# Patient Record
Sex: FEMALE | Race: AMERICAN INDIAN OR ALASKA NATIVE
[De-identification: names, ages, dates, MRNs, and addresses within clinical notes are randomized per-mention and may not be internally consistent; named-entity substitution may affect disease eponyms.]

---

## 2018-08-02 ENCOUNTER — HOSPITAL ENCOUNTER (OUTPATIENT)
Dept: HOSPITAL 31 - C.SDS | Age: 28
Discharge: HOME | End: 2018-08-02
Attending: PODIATRIST
Payer: MEDICAID

## 2018-08-02 VITALS — TEMPERATURE: 97.6 F

## 2018-08-02 VITALS — BODY MASS INDEX: 45.5 KG/M2

## 2018-08-02 VITALS
HEART RATE: 72 BPM | OXYGEN SATURATION: 99 % | RESPIRATION RATE: 18 BRPM | DIASTOLIC BLOOD PRESSURE: 73 MMHG | SYSTOLIC BLOOD PRESSURE: 135 MMHG

## 2018-08-02 DIAGNOSIS — M21.611: Primary | ICD-10-CM

## 2018-08-02 DIAGNOSIS — M20.11: ICD-10-CM

## 2018-08-02 PROCEDURE — 28296 COR HLX VLGS DSTL MTAR OSTEO: CPT

## 2018-08-02 PROCEDURE — 73620 X-RAY EXAM OF FOOT: CPT

## 2018-08-02 PROCEDURE — 88304 TISSUE EXAM BY PATHOLOGIST: CPT

## 2018-08-02 NOTE — RAD
Date of service: 



08/02/2018



PROCEDURE:  Right Foot Radiographs.



HISTORY:

s/p right foot surgery  



COMPARISON:

None.



FINDINGS:



BONES:

Normal. No fracture. 



JOINTS:

Normal. 



SOFT TISSUES:

Subcutaneous air identified at the level of the digits.  Adjacent 

soft tissue swelling noted. 



OTHER FINDINGS:

None.



IMPRESSION:

Postoperative findings identified.

## 2018-08-02 NOTE — PCM.SURG1
Surgeon's Initial Post Op Note





- Surgeon's Notes


Surgeon: Dr. Jayme Aguero


Assistant: Dr. Carranza PGY-3, Dr. Osman PGY-1 


Type of Anesthesia: IV Sedation, Local


Anesthesia Administered By: Dima Hackett


Pre-Operative Diagnosis: right foot hallux valgus deformity


Operative Findings: see dictation.  3-0, 4-0 vicryl, 4-0 nylon.  16CC 2% 

lidocaine, 8 CC 0.5%marcaine plain, 1CC dex 4mg/kg


Post-Operative Diagnosis: same


Operation Performed: right foot bunionectomy


Specimen/Specimens Removed: bone


Estimated Blood Loss: EBL {In ML}: 2


Blood Products Given: N/A


Drains Used: No Drains


Post-Op Condition: Good


Date of Surgery/Procedure: 08/02/18


Time of Surgery/Procedure: 09:06

## 2018-08-03 NOTE — OP
Copied To:  Catarina Carranza DPM

Attending MD:  Jayme Aguero DPM



PROCEDURE DATE:  08/02/2018



SURGEON:  Jayme Aguero DPM.



ASSISTANT:  Catarina Carranza, PGY-3, Pedro Sandoval MD, PGY-1.



ANESTHETIST:  Tony Mitchell MD _____.



ANESTHESIA:  IV sedation with local.



PREOPERATIVE DIAGNOSIS:  Right foot painful hallux valgus deformity.



POSTOPERATIVE DIAGNOSIS:  Right foot painful hallux valgus deformity.



NAME OF PROCEDURE:  Right foot first metatarsal osteotomy with cutting of

soft tissue and bone.



INDICATIONS:  The patient is a 27-year-old female with the above-mentioned

diagnosis.  The patient has exhausted multiple forms of conservative

treatment at this time and now requires surgical intervention.  The patient

signed a consent after careful explanation of risks, benefits,

complications, and alternatives for surgical procedure.  No guarantees were

given nor implied.  The patient was brought into the operating room and

placed on the operating room table in a supine position.  A time-out was

performed for identification of the correct patient and procedure.  The

patient received a total of 16 mL of 2% lidocaine plain in a local

block-type fashion to the right foot.  Once local anesthesia was achieved,

the right foot was then prepped and draped in normal sterile manner and the

procedure began.



DESCRIPTION OF PROCEDURE:  Attention was directed to the dorsal aspect of

the first metatarsal head of the right foot, where an approximately 4 cm

linear longitudinal incision was made medial and parallel to the tendon of

the extensor hallucis longus involving the contour of the deformity.  The

incision was deepened through subcutaneous tissue with care being taken to

identify and retract all vital neurovascular structures.  All bleeders were

cauterized and ligated as necessary.  At this time, an inverted L-type

capsulotomy was performed over the dorsal aspect of the first

metatarsophalangeal joint.  The periosteum and capsular structures were

then carefully dissected free of other osseous attachments and resected

medially and laterally thus exposing the head of the first metatarsal into

the operative field.  Next utilizing a sagittal bone saw, the hypertrophied

medial eminence was resected and passed from the operative field.  All

rough edges were smoothed down as necessary.  Correction of the deformity

was assessed at this time and noted to be excellent.  The incision site was

then copiously irrigated with sterile normal saline.  The periosteum and

capsular structures were then reapproximated with 3-0 Vicryl.  The

subcutaneous tissue was reapproximated with 4-0 Vicryl and the skin was

reapproximated with 4-0 nylon.  Postoperative injection of 8 mL of 0.5%

Marcaine plain and 1 mL of dexamethasone 4 mg/kg was given in the local

block-type fashion to the right foot.  The postoperative dressing including

Xeroform, 4 x 4 gauze, Lilly, Coban was applied to the right foot.



POSTOPERATIVE CONDITION:  The patient tolerated the anesthesia and the

procedure well and was escorted to the recovery room with vital signs

stable and neurovascular status intact to the right foot.  The patient will

follow up with Dr. Aguero as an outpatient.





__________________________________________

Catarina Carranza DPM





__________________________________________

<Jayme Aguero DPM>



DD:  08/03/2018 7:57:08

DT:  08/03/2018 9:12:37

Job # [id number]

## 2018-10-26 ENCOUNTER — HOSPITAL ENCOUNTER (INPATIENT)
Dept: HOSPITAL 42 - ED | Age: 28
LOS: 2 days | Discharge: LEFT BEFORE DISCHARGE | DRG: 430 | End: 2018-10-28
Attending: PSYCHIATRY & NEUROLOGY | Admitting: PSYCHIATRY & NEUROLOGY
Payer: MEDICAID

## 2018-10-26 VITALS — OXYGEN SATURATION: 100 %

## 2018-10-26 VITALS — BODY MASS INDEX: 43.5 KG/M2

## 2018-10-26 DIAGNOSIS — F41.9: ICD-10-CM

## 2018-10-26 DIAGNOSIS — F32.2: Primary | ICD-10-CM

## 2018-10-26 LAB
ALBUMIN SERPL-MCNC: 4.4 G/DL (ref 3–4.8)
ALBUMIN/GLOB SERPL: 1.2 {RATIO} (ref 1.1–1.8)
ALT SERPL-CCNC: 15 U/L (ref 7–56)
AMORPH SED URNS QL MICRO: (no result)
APAP SERPL-MCNC: < 10 UG/ML (ref 10–20)
APPEARANCE UR: CLEAR
AST SERPL-CCNC: 17 U/L (ref 14–36)
BACTERIA #/AREA URNS HPF: (no result) /[HPF]
BASOPHILS # BLD AUTO: 0.03 K/MM3 (ref 0–2)
BASOPHILS NFR BLD: 0.3 % (ref 0–3)
BILIRUB UR-MCNC: NEGATIVE MG/DL
BUN SERPL-MCNC: 15 MG/DL (ref 7–21)
CALCIUM SERPL-MCNC: 9.6 MG/DL (ref 8.4–10.5)
COLOR UR: YELLOW
EOSINOPHIL # BLD: 0.1 10*3/UL (ref 0–0.7)
EOSINOPHIL NFR BLD: 0.6 % (ref 1.5–5)
ERYTHROCYTE [DISTWIDTH] IN BLOOD BY AUTOMATED COUNT: 14.5 % (ref 11.5–14.5)
GFR NON-AFRICAN AMERICAN: > 60
GLUCOSE UR STRIP-MCNC: NEGATIVE MG/DL
GRANULOCYTES # BLD: 4.39 10*3/UL (ref 1.4–6.5)
GRANULOCYTES NFR BLD: 49.8 % (ref 50–68)
HGB BLD-MCNC: 11.5 G/DL (ref 12–16)
LEUKOCYTE ESTERASE UR-ACNC: NEGATIVE LEU/UL
LYMPHOCYTES # BLD: 3.8 10*3/UL (ref 1.2–3.4)
LYMPHOCYTES NFR BLD AUTO: 43.4 % (ref 22–35)
MCH RBC QN AUTO: 27.4 PG (ref 25–35)
MCHC RBC AUTO-ENTMCNC: 31.6 G/DL (ref 31–37)
MCV RBC AUTO: 86.9 FL (ref 80–105)
MONOCYTES # BLD AUTO: 0.5 10*3/UL (ref 0.1–0.6)
MONOCYTES NFR BLD: 5.9 % (ref 1–6)
PH UR STRIP: 6 [PH] (ref 4.7–8)
PLATELET # BLD: 370 10^3/UL (ref 120–450)
PMV BLD AUTO: 9.2 FL (ref 7–11)
PROT UR STRIP-MCNC: (no result) MG/DL
RBC # BLD AUTO: 4.19 10^6/UL (ref 3.5–6.1)
RBC # UR STRIP: (no result) /UL
SALICYLATES SERPL-MCNC: < 1 MG/DL (ref 2–20)
SP GR UR STRIP: 1.02 (ref 1–1.03)
UROBILINOGEN UR STRIP-ACNC: 0.2 E.U./DL
WBC # BLD AUTO: 8.8 10^3/UL (ref 4.5–11)
WBC #/AREA URNS HPF: (no result) /HPF (ref 0–6)

## 2018-10-26 NOTE — PCM.BM
<Fuad Jiménez - Last Filed: 10/26/18 17:59>





Treatment Plan Problems





- Problems identified on initial assessmt


  ** FEELING OF WORTHLESSNESS


Date Initiated: 10/26/18


Time Initiated: 18:00


Assessment reference: HP, NA


Status: Active





  ** HOPELESSNESS/HELPLESSNESS


Date Initiated: 10/26/18


Time Initiated: 18:01


Assessment reference: HP, NA


Status: Active





  ** NURTITION MORE THEN BODY REQUIREMENTS


Date Initiated: 10/26/18


Time Initiated: 18:02


Assessment reference: HP, Other


Status: Active





Treatment assets and liabiliti


Patient Assests: cooperative, physically healthy, negotiates basic needs


Patient Liabilities: other





- Milieu Protocol


Maintain good personal hygiene: daily Encourage regular showers, daily Remind 

patient to perform daily oral care, daily Assist patient to perform ADL's


Maintain personal safety: daily Educate patient to report safety concerns to 

staff, daily Monitor environment for contraband/sharps


Medication safety: Monitor for expected outcome, potential side effects: daily, 

Assess barriers to learning: daily, Assess readiness for medication education: 

daily





Discharge/Continuing Care





- Education Needs


Education Needs: Family Medication, Family Diagnosis/Disease Process, Family 

Coping Skills, Family Community resources, Family Activities of Daily Living, 

Family Health Practices/Safety, Family Personal Hygiene/Grooming, Family 

Aftercare Safety Plan, Patient Medication, Patient Diagnosis/Disease Process, 

Patient Coping Skills, Patient Community resources, Patient Activities of Daily 

Living, Patient Health Practices/Safety, Patient Personal Hygiene/Grooming, 

Patient Aftercare Safety Plan





- Discharge


Discharge Criteria: Tolerates medication w/o severe side effects, Free of 

Suicidal thoughts, Free of Homicidal thoughts, Free of paranoid thoughts, Free 

of agitation, Normal sleep pattern





<Robby Thibodeaux - Last Filed: 10/27/18 10:31>





- Diagnosis


(1) Depressed


Status: Acute   


Interventions: 


Group, milieu and supportive tx


* Celexa 5 mg po daily for depression


* Ativan 0.5 mg po bid prn for anxiety


* Seroquel 25 mg po HS discontinued, patient refused last night and doesn't want

  to take this medication


* Sonata 5 mg HS prn: insomnia


* Patient will be discharged on her 48 hour letter AMA tomorrow if she continues

  to present as calm without evidence of perceptual distrubance or suicidal 

  thoughts. 


10/27/18 10:30

## 2018-10-26 NOTE — ED PDOC
Arrival/HPI





- General


Chief Complaint: Psychiatric Evaluation


Time Seen by Provider: 10/26/18 10:42





- History of Present Illness


Narrative History of Present Illness (Text): 





28 yr old F w/ hx of Hysterectomy, Bunionectomy, previous SI and suicide 

attempts p/w SI. Pt notes that she went to health clinic today and was sent in 

for SI. Pt notes feeling down and depressed recently and has been having 

thoughts of hurting herself. She denies any plan but notes previous thoughts of 

hurting herself as well as previous suicide attempts. She denies any ingestion 

or cuts to herself today. She denies any headache, fever, chills or night 

sweats. No CP or SOB. No trauma. No abdominal pain. No constipation or diarrhea.

No dark or bloody stool. 





No other complaints. 








Past Medical History





- Infectious Disease


Hx of Infectious Diseases: None





- Cardiac


Hx Cardiac Disorders: No





- Pulmonary


Hx Respiratory Disorders: No





- Neurological


Hx Neurological Disorder: No





- HEENT


Other/Comment: LAZY EYE-LEFT





- Renal


Hx Renal Disorder: No





- Endocrine/Metabolic


Hx Endocrine Disorders: No





- Hematological/Oncological


Hx Blood Disorders: No





- Integumentary


Hx Dermatological Disorder: Yes


Hx Eczema: Yes





- Musculoskeletal/Rheumatological


Hx Musculoskeletal Disorders: Yes


Other/Comment: HX: BUNION RIGHT FOOT





- Gastrointestinal


Hx Gastrointestinal Disorders: No





- Genitourinary/Gynecological


Hx Genitourinary Disorders: Yes





- Psychiatric


Hx Psychophysiologic Disorder: No


Hx Substance Use: No





- Surgical History


Hx Eye Surgery: Yes (left lazy eye)


Other/Comment: HX: POLYPS X3 REMOVED FROM UTERUS(07/11/18)





- Anesthesia


Hx Anesthesia: Yes


Hx Anesthesia Reactions: No


Hx Malignant Hyperthermia: No





Family/Social History


Family/Social History: Unknown Family HX


Smoking Status: Never Smoked


Hx Alcohol Use: Yes


Hx Substance Use: No





Allergies/Home Meds


Allergies/Adverse Reactions: 


Allergies





No Known Allergies Allergy (Verified 12/03/16 17:58)


   








Home Medications: 


                                    Home Meds











 Medication  Instructions  Recorded  Confirmed


 


Acetaminophen [Tylenol 325mg tab] 2 tab PO PRN PRN 08/02/18 08/02/18














Review of Systems





- Review of Systems


Constitutional: absent: Fatigue


Eyes: absent: Vision Changes, Photophobia


ENT: absent: Hearing Changes, Tinnitus


Respiratory: absent: SOB, Cough


Cardiovascular: absent: Chest Pain, Palpitations


Gastrointestinal: absent: Abdominal Pain, Stool Changes


Genitourinary Female: absent: Dysuria, Frequency, Hematuria, Urine Output 

Changes, Vaginal Bleeding, Vaginal Discharge


Musculoskeletal: absent: Arthralgias, Back Pain, Neck Pain


Skin: absent: Rash, Pruritis, Skin Lesions


Neurological: absent: Headache, Dizziness, Focal Weakness


Endocrine: absent: Diaphoresis, Polyuria


Hemo/Lymphatic: absent: Adenopathy, Easy Bleeding


Psychiatric: Depression, Suicidal Ideation





Physical Exam


Appearance: Positive for: Well-Appearing


Pain Distress: None


Mental Status: Positive for: Alert and Oriented X 3





- Systems Exam


Head: Present: Atraumatic, Normocephalic.  No: Tenderness, Contusion, 

Ecchymosis, Abrasion


Pupils: Present: PERRL.  No: Sluggish, Non-Reactive


Extroacular Muscles: Present: EOMI.  No: Gaze Palsy, Entrapment


Conjunctiva: Present: Normal.  No: Injected, Icteric


Ears: Present: Normal, NORMAL TM, Normal Canal.  No: Erythema


Mouth: Present: Moist Mucous Membranes, Normal Tounge.  No: Dry, Drooling


Pharnyx: Present: Normal.  No: ERYTHEMA, EXUDATE, TONSILS ENLARGED


Nose (External): Present: Atraumatic


Nose (Internal): Present: Normal Inspection.  No: Septal Hematoma


Neck: Present: Normal Range of Motion.  No: Meningeal Signs, MIDLINE TENDERNESS,

Paraspinal Tenderness, JVD


Respiratory/Chest: Present: Clear to Auscultation, Good Air Exchange.  No: 

Respiratory Distress, Accessory Muscle Use


Cardiovascular: Present: Regular Rate and Rhythm.  No: Murmurs, Normal S1, S2


Abdomen: No: Tenderness, Distention, Normal Bowel Sounds


Back: Present: Normal Inspection.  No: CVA Tenderness, Midline Tenderness


Upper Extremity: Present: Normal Inspection, Normal ROM, NORMAL PULSES, Norm 2-

Pt Discrimination.  No: Cyanosis, Edema


Lower Extremity: Present: Normal Inspection, NORMAL PULSES, Normal ROM, 

Capillary Refill < 2 s.  No: Edema, CALF TENDERNESS


Neurological: Present: GCS=15, CN II-XII Intact, Speech Normal, Motor Func 

Grossly Intact, Normal Sensory Function, Normal Cerebellar Funct, Gait Normal


Skin: Present: Warm, Dry


Psychiatric: Present: Alert, Oriented x 3, Suicidal Ideation, Other (flat 

affect).  No: Homicidal Ideation, Intoxicated





Medical Decision Making


ED Course and Treatment: 





28 yr old female presents sent in from clinic for SI. Previous hx of SI w. 

attempts. Likely will require PES eval. Neuro exam unremarkable. Pt in NAD. 

Pending labs and psych to see. 





10/26/18 11:18


EKG: Ordered, reviewed, and independently interpreted the EKG.


Rate : 63 BPM


Rhythm : NSR


Interpretation : No ST-segment elevations or depressions, no T-wave inversions, 

normal intervals.





10/26/18 11:42


PES EVAL placed 





10/26/18 12:21


LAbs reviewed: unremarkable


Medically clear





10/26/18 14:14


appreciate consult w/ Maulik: PES: To be admitted to Psych Under Dr. Di Villegas 


Pt in NAD














Disposition/Present on Arrival





- Present on Arrival


Any Indicators Present on Arrival: No


History of DVT/PE: No


History of Uncontrolled Diabetes: No


Urinary Catheter: No


History Surgical Site Infection Following: None





- Disposition


Have Diagnosis and Disposition been Completed?: Yes


Diagnosis: 


 Schizophrenia





Disposition: HOSPITALIZED


Disposition Time: 14:14


Condition: GOOD

## 2018-10-27 VITALS — RESPIRATION RATE: 20 BRPM

## 2018-10-27 LAB
HDLC SERPL-MCNC: 32 MG/DL (ref 29–60)
LDLC SERPL-MCNC: 124 MG/DL (ref 0–129)
T4 FREE SERPL-MCNC: 0.83 NG/DL (ref 0.78–2.19)

## 2018-10-27 PROCEDURE — GZ3ZZZZ MEDICATION MANAGEMENT: ICD-10-PCS | Performed by: PSYCHIATRY & NEUROLOGY

## 2018-10-27 NOTE — PCM.PSYCH
Initial Psychiatric Evaluation





- Initial Psychiatric Evaluation


Type of Admission: Voluntary


History of Present Illness and Precipitating Events: 


Miss Dorian Jessica is a single 28 year old AA female with history of depression,

Attention Deficit Disorder (per patient), no prior psychiatric admissions or 

medication management who was referred by [facility] therapist to come to [facility] after she expressed having "suicidal tendencies" (with no 

plan). Collateral obtained in the ER from her mother confirm that patient has 

been depressed over her unemployment however mother didn't feel that patient was

a danger to herself. 


Patient has been calm and cooperative on the unit. Grooming is acceptable and 

she is well oriented to month, year, location and circumstances. She admits to d

epression however reports she is hopeful and denies having any suicidal 

thoughts. Confirms she never had an active plan to harm herself. Patient will 

not retract the 48 hour notice she place yesterday at 4 pm. Patient reports that

her main stressors are related to "love, work and life". Her affect is 

constricted but fairly genuine and related with good focus. Thought process 

appears coherent and goal-directed. 


 


PSYCHIATRIC HISTORY


Patient denies any prior formal psychiatric history





SOCIAL HISTORY


Born and raised in [facility] but has a . No children. Resides with her mother 

and brother. Unemployed. Denies any drug/alcohol or tobacco abuse. 


The patient failed the outpatient lower level of care: No


Current Medications: 





Active Medications











Generic Name Dose Route Start Last Admin





  Trade Name Freq  PRN Reason Stop Dose Admin


 


Acetaminophen  650 mg  10/26/18 21:04  10/26/18 22:04





  Tylenol 325mg Tab  PO   650 mg





  Q4H PRN   Administration





  Pain, moderate (4-7)   





     





     





     


 


Citalopram Hydrobromide  5 mg  10/27/18 08:00  





  Celexa  PO   





  DAILY TRAVON   





     





     





     





     


 


Lorazepam  0.5 mg  10/26/18 16:36  





  Ativan  PO   





  BID PRN   





  Anxiety   





     





  Protocol   





     


 


Lorazepam  2 mg  10/26/18 16:40  





  Ativan  IM   





  Q6H PRN   





  Agitation   





     





  Protocol   





     


 


Quetiapine Fumarate  25 mg  10/26/18 22:00  10/26/18 22:04





  Seroquel  PO   Not Given





  HS TRAVON   





     





     





  Protocol   





     


 


Ziprasidone  20 mg  10/26/18 16:39  





  Geodon Cap  PO   





  Q6H PRN   





  Agitation   





     





  Protocol   





     


 


Ziprasidone  20 mg  10/26/18 16:41  





  Geodon Inj  IM   





  Q6 PRN   





  Agitation   





     





  Protocol   





     














Present on Admission





- Present on Admission


Any Indicators Present on Admission: No





Past Patient History





- PSYCHIATRIC


Hx Substance Use: No





- Infectious Disease


Hx of Infectious Diseases: None





- Past Medical History & Family History


Past Medical History?: Yes





- CARDIAC


Hx Cardiac Disorders: No





- PULMONARY


Hx Respiratory Disorders: No





- NEUROLOGICAL


Hx Neurological Disorder: No





- HEENT


Other/Comment: LAZY EYE-LEFT





- RENAL


Hx Chronic Kidney Disease: No





- ENDOCRINE/METABOLIC


Hx Endocrine Disorders: No





- HEMATOLOGICAL/ONCOLOGICAL


Hx Blood Disorders: No





- INTEGUMENTARY


Hx Dermatological Problems: Yes


Hx Eczema: Yes





- MUSCULOSKELETAL/RHEUMATOLOGICAL


Hx Musculoskeletal Disorders: Yes


Other/Comment: HX: BUNION RIGHT FOOT





- GASTROINTESTINAL


Hx Gastrointestinal Disorders: No





- GENITOURINARY/GYNECOLOGICAL


Hx Genitourinary Disorders: Yes





- SURGICAL HISTORY


Hx Eye Surgery: Yes (left lazy eye)


Other/Comment: HX: POLYPS X3 REMOVED FROM UTERUS(07/11/18)





- ANESTHESIA


Hx Anesthesia: Yes


Hx Anesthesia Reactions: No


Hx Malignant Hyperthermia: No





Meds


Allergies/Adverse Reactions: 


                                    Allergies











Allergy/AdvReac Type Severity Reaction Status Date / Time


 


No Known Allergies Allergy   Verified 12/03/16 17:58














Mental Status Examination





- Personal Presentation


Personal Presentation: Looks stated age





- Affect


Affect: Constricted (with reactivity)





- Motor Activity


Motor Activity: Calm





- Reliability in Providing Information


Reliability in Providing Information: Good





- Speech


Speech: Organized





- Mood


Mood: Depressed





- Formal Thought Process


Formal Thought Process: No Impairment





- Obsessions/Compulsions


Obsessions: No


Compulsions: No





- Cognitive Functions


Orientation: Person, Place, Situation


Sensorium: Alert


Attention/Concentration: Attentive


Estimate of Intelligence: Average


Judgement: Intact, as evidence by: Good judgement


Memory: Recent intact, as evidence by: Ability to recall events of the day





- Risk


Risk: Suicidal





- Strength & Assets Inventory


Strength & Assets Inventory: Family support





Psychiatric Physical Exam





- Physical Exam


Reviewed and confirmed: Emergency Department Physical Exam





Results





- Vital Signs


Recent Vital Signs: 





                                Last Vital Signs











Temp  98.9 F   10/26/18 15:59


 


Pulse  71   10/26/18 15:59


 


Resp  17   10/26/18 15:59


 


BP  129/82   10/26/18 15:59


 


Pulse Ox  100   10/26/18 15:20














- Labs


Result Diagrams: 


                                 10/26/18 11:10





                                 10/26/18 11:10


Labs: 





                         Laboratory Results - last 24 hr











  10/26/18 10/26/18 10/26/18





  10:55 10:55 11:10


 


WBC    8.8


 


RBC    4.19


 


Hgb    11.5 L


 


Hct    36.4


 


MCV    86.9


 


MCH    27.4


 


MCHC    31.6


 


RDW    14.5


 


Plt Count    370


 


MPV    9.2


 


Gran %    49.8 L


 


Lymph % (Auto)    43.4 H


 


Mono % (Auto)    5.9


 


Eos % (Auto)    0.6 L


 


Baso % (Auto)    0.3


 


Gran #    4.39


 


Lymph # (Auto)    3.8 H


 


Mono # (Auto)    0.5


 


Eos # (Auto)    0.1


 


Baso # (Auto)    0.03


 


Sodium   


 


Potassium   


 


Chloride   


 


Carbon Dioxide   


 


Anion Gap   


 


BUN   


 


Creatinine   


 


Est GFR ( Amer)   


 


Est GFR (Non-Af Amer)   


 


Random Glucose   


 


Calcium   


 


Magnesium   


 


Total Bilirubin   


 


AST   


 


ALT   


 


Alkaline Phosphatase   


 


Total Protein   


 


Albumin   


 


Globulin   


 


Albumin/Globulin Ratio   


 


Urine Color  Yellow  


 


Urine Appearance  Clear  


 


Urine pH  6.0  


 


Ur Specific Gravity  1.025  


 


Urine Protein  Trace H  


 


Urine Glucose (UA)  Negative  


 


Urine Ketones  Negative  


 


Urine Blood  Trace-intact H  


 


Urine Nitrate  Negative  


 


Urine Bilirubin  Negative  


 


Urine Urobilinogen  0.2  


 


Ur Leukocyte Esterase  Negative  


 


Urine RBC  2 - 5  


 


Urine WBC  0 - 2  


 


Ur Epithelial Cells  6 - 8  


 


Amorphous Sediment  Few  


 


Urine Bacteria  Many  


 


Urine Other  Uyeast  


 


Salicylates   


 


Urine Opiates Screen   Negative 


 


Urine Methadone Screen   Negative 


 


Acetaminophen   


 


Ur Barbiturates Screen   Negative 


 


Ur Phencyclidine Scrn   Negative 


 


Ur Amphetamines Screen   Negative 


 


U Benzodiazepines Scrn   Negative 


 


U Oth Cocaine Metabols   Negative 


 


U Cannabinoids Screen   Negative 


 


Alcohol, Quantitative   














  10/26/18 10/26/18 10/26/18





  11:10 11:10 11:10


 


WBC   


 


RBC   


 


Hgb   


 


Hct   


 


MCV   


 


MCH   


 


MCHC   


 


RDW   


 


Plt Count   


 


MPV   


 


Gran %   


 


Lymph % (Auto)   


 


Mono % (Auto)   


 


Eos % (Auto)   


 


Baso % (Auto)   


 


Gran #   


 


Lymph # (Auto)   


 


Mono # (Auto)   


 


Eos # (Auto)   


 


Baso # (Auto)   


 


Sodium  140  


 


Potassium  4.2  


 


Chloride  105  


 


Carbon Dioxide  25  


 


Anion Gap  15  


 


BUN  15  


 


Creatinine  0.7  


 


Est GFR ( Amer)  > 60  


 


Est GFR (Non-Af Amer)  > 60  


 


Random Glucose  97  


 


Calcium  9.6  


 


Magnesium  2.0  


 


Total Bilirubin  0.3  


 


AST  17  


 


ALT  15  


 


Alkaline Phosphatase  53  


 


Total Protein  8.1  


 


Albumin  4.4  


 


Globulin  3.6  


 


Albumin/Globulin Ratio  1.2  


 


Urine Color   


 


Urine Appearance   


 


Urine pH   


 


Ur Specific Gravity   


 


Urine Protein   


 


Urine Glucose (UA)   


 


Urine Ketones   


 


Urine Blood   


 


Urine Nitrate   


 


Urine Bilirubin   


 


Urine Urobilinogen   


 


Ur Leukocyte Esterase   


 


Urine RBC   


 


Urine WBC   


 


Ur Epithelial Cells   


 


Amorphous Sediment   


 


Urine Bacteria   


 


Urine Other   


 


Salicylates   < 1 L 


 


Urine Opiates Screen   


 


Urine Methadone Screen   


 


Acetaminophen   < 10.0 L 


 


Ur Barbiturates Screen   


 


Ur Phencyclidine Scrn   


 


Ur Amphetamines Screen   


 


U Benzodiazepines Scrn   


 


U Oth Cocaine Metabols   


 


U Cannabinoids Screen   


 


Alcohol, Quantitative    < 10














- EKG Data


EKG Interpreted by: ER Physician





- EKG Data


EKG comments: 





10/26/18 11:18


EKG: Ordered, reviewed, and independently interpreted the EKG.


Rate : 63 BPM


Rhythm : NSR


Interpretation : No ST-segment elevations or depressions, no T-wave inversions, 

normal intervals.








DSM Plan





- DSM 5


DSM 5 Diagnosis: 


Major Depression, Moderate-Severe








- Recommended/Plan of Treatment


Treatment Recommendations and Plan of Treatment: 





* Group, milieu and supportive tx


* Celexa 5 mg po daily for depression


* Ativan 0.5 mg po bid prn for anxiety


* Seroquel 25 mg po HS discontinued, patient refused last night and doesn't want

  to take this medication


* Sonata 5 mg HS prn: insomnia


* Patient will be discharged on her 48 hour letter AMA tomorrow if she continues

  to present as calm without evidence of perceptual distrubance or suicidal 

  thoughts. 


* Vitals reviewed and noted below:


                                                                Selected Entries











  10/26/18





  15:59


 


Temperature 98.9 F


 


Pulse Rate 71


 


Respiratory 17





Rate 


 


Blood Pressure 129/82





 


* 10/26/18 11:18 PER ER RECORDS


EKG: Ordered, reviewed, and independently interpreted the EKG.


Rate : 63 BPM


Rhythm : NSR


Interpretation : No ST-segment elevations or depressions, no T-wave inversions, 

normal intervals.





* Admission labs noted below:





                                Laboratory Tests











  10/26/18 10/26/18 10/26/18





  10:55 10:55 11:10


 


WBC    8.8


 


RBC    4.19


 


Hgb    11.5 L


 


Hct    36.4


 


MCV    86.9


 


MCH    27.4


 


MCHC    31.6


 


RDW    14.5


 


Plt Count    370


 


MPV    9.2


 


Gran %    49.8 L


 


Lymph % (Auto)    43.4 H


 


Mono % (Auto)    5.9


 


Eos % (Auto)    0.6 L


 


Baso % (Auto)    0.3


 


Gran #    4.39


 


Lymph # (Auto)    3.8 H


 


Mono # (Auto)    0.5


 


Eos # (Auto)    0.1


 


Baso # (Auto)    0.03


 


Sodium   


 


Potassium   


 


Chloride   


 


Carbon Dioxide   


 


Anion Gap   


 


BUN   


 


Creatinine   


 


Est GFR ( Amer)   


 


Est GFR (Non-Af Amer)   


 


Random Glucose   


 


Fasting Glucose   


 


Calcium   


 


Magnesium   


 


Total Bilirubin   


 


AST   


 


ALT   


 


Alkaline Phosphatase   


 


Total Protein   


 


Albumin   


 


Globulin   


 


Albumin/Globulin Ratio   


 


Triglycerides   


 


Cholesterol   


 


LDL Cholesterol Direct   


 


HDL Cholesterol   


 


Free T4   


 


TSH 3rd Generation   


 


Urine Color  Yellow  


 


Urine Appearance  Clear  


 


Urine pH  6.0  


 


Ur Specific Gravity  1.025  


 


Urine Protein  Trace H  


 


Urine Glucose (UA)  Negative  


 


Urine Ketones  Negative  


 


Urine Blood  Trace-intact H  


 


Urine Nitrate  Negative  


 


Urine Bilirubin  Negative  


 


Urine Urobilinogen  0.2  


 


Ur Leukocyte Esterase  Negative  


 


Urine RBC  2 - 5  


 


Urine WBC  0 - 2  


 


Ur Epithelial Cells  6 - 8  


 


Amorphous Sediment  Few  


 


Urine Bacteria  Many  


 


Urine Other  Uyeast  


 


Salicylates   


 


Urine Opiates Screen   Negative 


 


Urine Methadone Screen   Negative 


 


Acetaminophen   


 


Ur Barbiturates Screen   Negative 


 


Ur Phencyclidine Scrn   Negative 


 


Ur Amphetamines Screen   Negative 


 


U Benzodiazepines Scrn   Negative 


 


U Oth Cocaine Metabols   Negative 


 


U Cannabinoids Screen   Negative 


 


Alcohol, Quantitative   














  10/26/18 10/26/18 10/26/18





  11:10 11:10 11:10


 


WBC   


 


RBC   


 


Hgb   


 


Hct   


 


MCV   


 


MCH   


 


MCHC   


 


RDW   


 


Plt Count   


 


MPV   


 


Gran %   


 


Lymph % (Auto)   


 


Mono % (Auto)   


 


Eos % (Auto)   


 


Baso % (Auto)   


 


Gran #   


 


Lymph # (Auto)   


 


Mono # (Auto)   


 


Eos # (Auto)   


 


Baso # (Auto)   


 


Sodium  140  


 


Potassium  4.2  


 


Chloride  105  


 


Carbon Dioxide  25  


 


Anion Gap  15  


 


BUN  15  


 


Creatinine  0.7  


 


Est GFR ( Amer)  > 60  


 


Est GFR (Non-Af Amer)  > 60  


 


Random Glucose  97  


 


Fasting Glucose   


 


Calcium  9.6  


 


Magnesium  2.0  


 


Total Bilirubin  0.3  


 


AST  17  


 


ALT  15  


 


Alkaline Phosphatase  53  


 


Total Protein  8.1  


 


Albumin  4.4  


 


Globulin  3.6  


 


Albumin/Globulin Ratio  1.2  


 


Triglycerides   


 


Cholesterol   


 


LDL Cholesterol Direct   


 


HDL Cholesterol   


 


Free T4   


 


TSH 3rd Generation   


 


Urine Color   


 


Urine Appearance   


 


Urine pH   


 


Ur Specific Gravity   


 


Urine Protein   


 


Urine Glucose (UA)   


 


Urine Ketones   


 


Urine Blood   


 


Urine Nitrate   


 


Urine Bilirubin   


 


Urine Urobilinogen   


 


Ur Leukocyte Esterase   


 


Urine RBC   


 


Urine WBC   


 


Ur Epithelial Cells   


 


Amorphous Sediment   


 


Urine Bacteria   


 


Urine Other   


 


Salicylates   < 1 L 


 


Urine Opiates Screen   


 


Urine Methadone Screen   


 


Acetaminophen   < 10.0 L 


 


Ur Barbiturates Screen   


 


Ur Phencyclidine Scrn   


 


Ur Amphetamines Screen   


 


U Benzodiazepines Scrn   


 


U Oth Cocaine Metabols   


 


U Cannabinoids Screen   


 


Alcohol, Quantitative    < 10














  10/27/18 10/27/18





  06:45 06:45


 


WBC  


 


RBC  


 


Hgb  


 


Hct  


 


MCV  


 


MCH  


 


MCHC  


 


RDW  


 


Plt Count  


 


MPV  


 


Gran %  


 


Lymph % (Auto)  


 


Mono % (Auto)  


 


Eos % (Auto)  


 


Baso % (Auto)  


 


Gran #  


 


Lymph # (Auto)  


 


Mono # (Auto)  


 


Eos # (Auto)  


 


Baso # (Auto)  


 


Sodium  


 


Potassium  


 


Chloride  


 


Carbon Dioxide  


 


Anion Gap  


 


BUN  


 


Creatinine  


 


Est GFR ( Amer)  


 


Est GFR (Non-Af Amer)  


 


Random Glucose  


 


Fasting Glucose  94 


 


Calcium  


 


Magnesium  


 


Total Bilirubin  


 


AST  


 


ALT  


 


Alkaline Phosphatase  


 


Total Protein  


 


Albumin  


 


Globulin  


 


Albumin/Globulin Ratio  


 


Triglycerides  87 


 


Cholesterol  174 


 


LDL Cholesterol Direct  124 


 


HDL Cholesterol  32 


 


Free T4   0.83


 


TSH 3rd Generation   2.60


 


Urine Color  


 


Urine Appearance  


 


Urine pH  


 


Ur Specific Gravity  


 


Urine Protein  


 


Urine Glucose (UA)  


 


Urine Ketones  


 


Urine Blood  


 


Urine Nitrate  


 


Urine Bilirubin  


 


Urine Urobilinogen  


 


Ur Leukocyte Esterase  


 


Urine RBC  


 


Urine WBC  


 


Ur Epithelial Cells  


 


Amorphous Sediment  


 


Urine Bacteria  


 


Urine Other  


 


Salicylates  


 


Urine Opiates Screen  


 


Urine Methadone Screen  


 


Acetaminophen  


 


Ur Barbiturates Screen  


 


Ur Phencyclidine Scrn  


 


Ur Amphetamines Screen  


 


U Benzodiazepines Scrn  


 


U Oth Cocaine Metabols  


 


U Cannabinoids Screen  


 


Alcohol, Quantitative  











 





- Tobacco Cessation


Tobacco Use Status for the last 30 days: Non User


Tobacco Use Treatment Practical Counseling Provided: No


Reason for not providing: patient doesn't use

## 2018-10-27 NOTE — CARD
--------------- APPROVED REPORT --------------





Date of service: 10/26/2018



EKG Measurement

Heart [id number]

[id number]

[id number]

[id number]

[id number]



<Conclusion>

Normal sinus rhythm

Normal ECG

## 2018-10-28 VITALS — SYSTOLIC BLOOD PRESSURE: 94 MMHG | DIASTOLIC BLOOD PRESSURE: 59 MMHG | HEART RATE: 70 BPM | TEMPERATURE: 97.7 F

## 2018-10-28 NOTE — [id number]
Mental Status Examination





- Mental Status Examination


Orientation: Person, Place, Situation


Memory: Intact


Mood: Depressed


Affect: Constricted


Speech: Appropriate


Attention: WNL


Concentration: WNL


Association: WNL


Fund of Knowledge: WNL


Formal Thought Process: No Impairment


Description of patient's judgement and insight: 


Fair improvement. D/c'ed with fair insignt and judgement


Psychotic Thoughts and Behaviors: 


Denied any hallucinations or paranoia. No delusions were elicited


Suicidal Ideation: No


Current Homicidal Ideation?: No





Discharge Summary





- Discharge Note


Reason for Hospitalization: 





Miss Dorian Jessica is a single 28 year old AA female with history of depression,

Attention Deficit Disorder (per patient), no prior psychiatric admissions or 

medication management who was referred by [facility] therapist to come to [facility] ([facility] ER after she expressed having "suicidal tendencies" (with no 

plan). Collateral obtained in the ER from her mother confirm that patient has 

been depressed over her unemployment however mother didn't feel that patient was

a danger to herself. 


 


Psychiatric History (includes Medical, Family, Personal Hx): See HPI


Laboratory Data: 





                              Abnormal Lab Results











  10/27/18 10/27/18 10/27/18





  06:45 06:45 06:45


 


Fasting Glucose  94  


 


Triglycerides  87  


 


Cholesterol  174  


 


LDL Cholesterol Direct  124  


 


HDL Cholesterol  32  


 


Free T4   0.83 


 


TSH 3rd Generation   2.60 


 


RPR    Nonreactive











                                Laboratory Tests











  10/26/18 10/26/18 10/26/18





  10:55 10:55 11:10


 


WBC    8.8


 


RBC    4.19


 


Hgb    11.5 L


 


Hct    36.4


 


MCV    86.9


 


MCH    27.4


 


MCHC    31.6


 


RDW    14.5


 


Plt Count    370


 


MPV    9.2


 


Gran %    49.8 L


 


Lymph % (Auto)    43.4 H


 


Mono % (Auto)    5.9


 


Eos % (Auto)    0.6 L


 


Baso % (Auto)    0.3


 


Gran #    4.39


 


Lymph # (Auto)    3.8 H


 


Mono # (Auto)    0.5


 


Eos # (Auto)    0.1


 


Baso # (Auto)    0.03


 


Sodium   


 


Potassium   


 


Chloride   


 


Carbon Dioxide   


 


Anion Gap   


 


BUN   


 


Creatinine   


 


Est GFR ( Amer)   


 


Est GFR (Non-Af Amer)   


 


Random Glucose   


 


Fasting Glucose   


 


Calcium   


 


Magnesium   


 


Total Bilirubin   


 


AST   


 


ALT   


 


Alkaline Phosphatase   


 


Total Protein   


 


Albumin   


 


Globulin   


 


Albumin/Globulin Ratio   


 


Triglycerides   


 


Cholesterol   


 


LDL Cholesterol Direct   


 


HDL Cholesterol   


 


Free T4   


 


TSH 3rd Generation   


 


Urine Color  Yellow  


 


Urine Appearance  Clear  


 


Urine pH  6.0  


 


Ur Specific Gravity  1.025  


 


Urine Protein  Trace H  


 


Urine Glucose (UA)  Negative  


 


Urine Ketones  Negative  


 


Urine Blood  Trace-intact H  


 


Urine Nitrate  Negative  


 


Urine Bilirubin  Negative  


 


Urine Urobilinogen  0.2  


 


Ur Leukocyte Esterase  Negative  


 


Urine RBC  2 - 5  


 


Urine WBC  0 - 2  


 


Ur Epithelial Cells  6 - 8  


 


Amorphous Sediment  Few  


 


Urine Bacteria  Many  


 


Urine Other  Uyeast  


 


Salicylates   


 


Urine Opiates Screen   Negative 


 


Urine Methadone Screen   Negative 


 


Acetaminophen   


 


Ur Barbiturates Screen   Negative 


 


Ur Phencyclidine Scrn   Negative 


 


Ur Amphetamines Screen   Negative 


 


U Benzodiazepines Scrn   Negative 


 


U Oth Cocaine Metabols   Negative 


 


U Cannabinoids Screen   Negative 


 


Alcohol, Quantitative   


 


RPR   














  10/26/18 10/26/18 10/26/18





  11:10 11:10 11:10


 


WBC   


 


RBC   


 


Hgb   


 


Hct   


 


MCV   


 


MCH   


 


MCHC   


 


RDW   


 


Plt Count   


 


MPV   


 


Gran %   


 


Lymph % (Auto)   


 


Mono % (Auto)   


 


Eos % (Auto)   


 


Baso % (Auto)   


 


Gran #   


 


Lymph # (Auto)   


 


Mono # (Auto)   


 


Eos # (Auto)   


 


Baso # (Auto)   


 


Sodium  140  


 


Potassium  4.2  


 


Chloride  105  


 


Carbon Dioxide  25  


 


Anion Gap  15  


 


BUN  15  


 


Creatinine  0.7  


 


Est GFR ( Amer)  > 60  


 


Est GFR (Non-Af Amer)  > 60  


 


Random Glucose  97  


 


Fasting Glucose   


 


Calcium  9.6  


 


Magnesium  2.0  


 


Total Bilirubin  0.3  


 


AST  17  


 


ALT  15  


 


Alkaline Phosphatase  53  


 


Total Protein  8.1  


 


Albumin  4.4  


 


Globulin  3.6  


 


Albumin/Globulin Ratio  1.2  


 


Triglycerides   


 


Cholesterol   


 


LDL Cholesterol Direct   


 


HDL Cholesterol   


 


Free T4   


 


TSH 3rd Generation   


 


Urine Color   


 


Urine Appearance   


 


Urine pH   


 


Ur Specific Gravity   


 


Urine Protein   


 


Urine Glucose (UA)   


 


Urine Ketones   


 


Urine Blood   


 


Urine Nitrate   


 


Urine Bilirubin   


 


Urine Urobilinogen   


 


Ur Leukocyte Esterase   


 


Urine RBC   


 


Urine WBC   


 


Ur Epithelial Cells   


 


Amorphous Sediment   


 


Urine Bacteria   


 


Urine Other   


 


Salicylates   < 1 L 


 


Urine Opiates Screen   


 


Urine Methadone Screen   


 


Acetaminophen   < 10.0 L 


 


Ur Barbiturates Screen   


 


Ur Phencyclidine Scrn   


 


Ur Amphetamines Screen   


 


U Benzodiazepines Scrn   


 


U Oth Cocaine Metabols   


 


U Cannabinoids Screen   


 


Alcohol, Quantitative    < 10


 


RPR   














  10/27/18 10/27/18 10/27/18





  06:45 06:45 06:45


 


WBC   


 


RBC   


 


Hgb   


 


Hct   


 


MCV   


 


MCH   


 


MCHC   


 


RDW   


 


Plt Count   


 


MPV   


 


Gran %   


 


Lymph % (Auto)   


 


Mono % (Auto)   


 


Eos % (Auto)   


 


Baso % (Auto)   


 


Gran #   


 


Lymph # (Auto)   


 


Mono # (Auto)   


 


Eos # (Auto)   


 


Baso # (Auto)   


 


Sodium   


 


Potassium   


 


Chloride   


 


Carbon Dioxide   


 


Anion Gap   


 


BUN   


 


Creatinine   


 


Est GFR ( Amer)   


 


Est GFR (Non-Af Amer)   


 


Random Glucose   


 


Fasting Glucose  94  


 


Calcium   


 


Magnesium   


 


Total Bilirubin   


 


AST   


 


ALT   


 


Alkaline Phosphatase   


 


Total Protein   


 


Albumin   


 


Globulin   


 


Albumin/Globulin Ratio   


 


Triglycerides  87  


 


Cholesterol  174  


 


LDL Cholesterol Direct  124  


 


HDL Cholesterol  32  


 


Free T4   0.83 


 


TSH 3rd Generation   2.60 


 


Urine Color   


 


Urine Appearance   


 


Urine pH   


 


Ur Specific Gravity   


 


Urine Protein   


 


Urine Glucose (UA)   


 


Urine Ketones   


 


Urine Blood   


 


Urine Nitrate   


 


Urine Bilirubin   


 


Urine Urobilinogen   


 


Ur Leukocyte Esterase   


 


Urine RBC   


 


Urine WBC   


 


Ur Epithelial Cells   


 


Amorphous Sediment   


 


Urine Bacteria   


 


Urine Other   


 


Salicylates   


 


Urine Opiates Screen   


 


Urine Methadone Screen   


 


Acetaminophen   


 


Ur Barbiturates Screen   


 


Ur Phencyclidine Scrn   


 


Ur Amphetamines Screen   


 


U Benzodiazepines Scrn   


 


U Oth Cocaine Metabols   


 


U Cannabinoids Screen   


 


Alcohol, Quantitative   


 


RPR    Nonreactive











Consultations:: List each consultation separately and include:  1. Reason for 

request.  2. Findings.  3. Follow-up


Consultations: 


NONE


Summary of Hospital Course include:: 1. Description of specific treatment plan 

utilized for patients during their course of treatmen.  2. Summarize the time-

course for resolution of acute symptoms and/or regressed behaviors.  3. Describe

issues identified and worked on during hospitalization.  4. Describe medication 

utilized.  5. Describe medical problems identified and treated.  6. Reassessment

of suicide risk


Summary of Hospital Course: 


Miss Dorian Jessica is a single 28 year old AA female with history of depression,

Attention Deficit Disorder (per patient), no prior psychiatric admissions or 

medication management who was referred by [facility] therapist to come to [facility] ER after she expressed having "suicidal tendencies" (with no plan).

Collateral obtained in the ER from her mother confirm that patient has been 

depressed over her unemployment however mother didn't feel that patient was a 

danger to herself. 


Patient has been calm and cooperative on the unit. Grooming is acceptable and 

she is well oriented to month, year, location and circumstances. She admits to 

depression however reports she is hopeful and denies having any suicidal 

thoughts. Confirms she never had an active plan to harm herself. Patient will 

not retract the 48 hour notice she place yesterday at 4 pm. Patient reports that

her main stressors are related to "love, work and life". Her affect is 

constricted but fairly genuine and related with good focus. Thought process 

appears coherent and goal-directed. 


 


PSYCHIATRIC HISTORY


Patient denies any prior formal psychiatric history





SOCIAL HISTORY


[place] but has a bf. No children. Resides with her mother 

and brother. Unemployed. Denies any drug/alcohol or tobacco abuse. 














DISCHARGE NOTE





I interviewed patient at bedside to assess continued stability for discharge. 

Patient is alert and well-oriented to month, year and circumstances. Eye contact

is good. Patient feels improved and denies any suicidal thoughts or thoughts to 

harm others. Patient confirms that she never had any active suicidal thoughts. 

Affect is calm, friendly, constricted but with some reactivity. Patient denies 

hallucinations and is not responding to internal stimuli. Thought process is 

clear and coherent. 


Patient feels comfortable with discharge today and denies any new concerns. She 

is aware that f/u and medications will not be provided if leaving AMA. She was 

encouraged to retract 48 hour letter, she refused. 


Patient denies acute discomfort or pain. Delusions and paranoia were not 

elicited on day of discharge. Patient indicated that she will return to [facility] for

therapy and doesn't want to take psychiatric medications.  





- Diagnosis


(1) Depressed


Assessment and Plan: 


Patient to f/u [facility] for therapy, defers on medication mgt


Current Visit: Yes   Status: Acute   





- Final Diagnosis (DSM 5)


Condition upon Discharge: FAIR


DSM 5: 


Major Depression-moderate to severe 


Disposition: AGAINST MEDICAL ADVICE


Follow-up Treatment Plan: 


Patient feels comfortable with discharge today and denies any new concerns. She 

is aware that f/u and medications will not be provided if leaving AMA. She was 

encouraged to retract 48 hour letter, she refused. 


Patient denies acute discomfort or pain. Delusions and paranoia were not 

elicited on day of discharge. Patient indicated that she will return to [facility] for

therapy and doesn't want to take psychiatric medications.


                                Laboratory Tests











  10/26/18 10/26/18 10/26/18





  10:55 10:55 11:10


 


WBC    8.8


 


RBC    4.19


 


Hgb    11.5 L


 


Hct    36.4


 


MCV    86.9


 


MCH    27.4


 


MCHC    31.6


 


RDW    14.5


 


Plt Count    370


 


MPV    9.2


 


Gran %    49.8 L


 


Lymph % (Auto)    43.4 H


 


Mono % (Auto)    5.9


 


Eos % (Auto)    0.6 L


 


Baso % (Auto)    0.3


 


Gran #    4.39


 


Lymph # (Auto)    3.8 H


 


Mono # (Auto)    0.5


 


Eos # (Auto)    0.1


 


Baso # (Auto)    0.03


 


Sodium   


 


Potassium   


 


Chloride   


 


Carbon Dioxide   


 


Anion Gap   


 


BUN   


 


Creatinine   


 


Est GFR ( Amer)   


 


Est GFR (Non-Af Amer)   


 


Random Glucose   


 


Fasting Glucose   


 


Calcium   


 


Magnesium   


 


Total Bilirubin   


 


AST   


 


ALT   


 


Alkaline Phosphatase   


 


Total Protein   


 


Albumin   


 


Globulin   


 


Albumin/Globulin Ratio   


 


Triglycerides   


 


Cholesterol   


 


LDL Cholesterol Direct   


 


HDL Cholesterol   


 


Free T4   


 


TSH 3rd Generation   


 


Urine Color  Yellow  


 


Urine Appearance  Clear  


 


Urine pH  6.0  


 


Ur Specific Gravity  1.025  


 


Urine Protein  Trace H  


 


Urine Glucose (UA)  Negative  


 


Urine Ketones  Negative  


 


Urine Blood  Trace-intact H  


 


Urine Nitrate  Negative  


 


Urine Bilirubin  Negative  


 


Urine Urobilinogen  0.2  


 


Ur Leukocyte Esterase  Negative  


 


Urine RBC  2 - 5  


 


Urine WBC  0 - 2  


 


Ur Epithelial Cells  6 - 8  


 


Amorphous Sediment  Few  


 


Urine Bacteria  Many  


 


Urine Other  Uyeast  


 


Salicylates   


 


Urine Opiates Screen   Negative 


 


Urine Methadone Screen   Negative 


 


Acetaminophen   


 


Ur Barbiturates Screen   Negative 


 


Ur Phencyclidine Scrn   Negative 


 


Ur Amphetamines Screen   Negative 


 


U Benzodiazepines Scrn   Negative 


 


U Oth Cocaine Metabols   Negative 


 


U Cannabinoids Screen   Negative 


 


Alcohol, Quantitative   














  10/26/18 10/26/18 10/26/18





  11:10 11:10 11:10


 


WBC   


 


RBC   


 


Hgb   


 


Hct   


 


MCV   


 


MCH   


 


MCHC   


 


RDW   


 


Plt Count   


 


MPV   


 


Gran %   


 


Lymph % (Auto)   


 


Mono % (Auto)   


 


Eos % (Auto)   


 


Baso % (Auto)   


 


Gran #   


 


Lymph # (Auto)   


 


Mono # (Auto)   


 


Eos # (Auto)   


 


Baso # (Auto)   


 


Sodium  140  


 


Potassium  4.2  


 


Chloride  105  


 


Carbon Dioxide  25  


 


Anion Gap  15  


 


BUN  15  


 


Creatinine  0.7  


 


Est GFR ( Amer)  > 60  


 


Est GFR (Non-Af Amer)  > 60  


 


Random Glucose  97  


 


Fasting Glucose   


 


Calcium  9.6  


 


Magnesium  2.0  


 


Total Bilirubin  0.3  


 


AST  17  


 


ALT  15  


 


Alkaline Phosphatase  53  


 


Total Protein  8.1  


 


Albumin  4.4  


 


Globulin  3.6  


 


Albumin/Globulin Ratio  1.2  


 


Triglycerides   


 


Cholesterol   


 


LDL Cholesterol Direct   


 


HDL Cholesterol   


 


Free T4   


 


TSH 3rd Generation   


 


Urine Color   


 


Urine Appearance   


 


Urine pH   


 


Ur Specific Gravity   


 


Urine Protein   


 


Urine Glucose (UA)   


 


Urine Ketones   


 


Urine Blood   


 


Urine Nitrate   


 


Urine Bilirubin   


 


Urine Urobilinogen   


 


Ur Leukocyte Esterase   


 


Urine RBC   


 


Urine WBC   


 


Ur Epithelial Cells   


 


Amorphous Sediment   


 


Urine Bacteria   


 


Urine Other   


 


Salicylates   < 1 L 


 


Urine Opiates Screen   


 


Urine Methadone Screen   


 


Acetaminophen   < 10.0 L 


 


Ur Barbiturates Screen   


 


Ur Phencyclidine Scrn   


 


Ur Amphetamines Screen   


 


U Benzodiazepines Scrn   


 


U Oth Cocaine Metabols   


 


U Cannabinoids Screen   


 


Alcohol, Quantitative    < 10














  10/27/18 10/27/18





  06:45 06:45


 


WBC  


 


RBC  


 


Hgb  


 


Hct  


 


MCV  


 


MCH  


 


MCHC  


 


RDW  


 


Plt Count  


 


MPV  


 


Gran %  


 


Lymph % (Auto)  


 


Mono % (Auto)  


 


Eos % (Auto)  


 


Baso % (Auto)  


 


Gran #  


 


Lymph # (Auto)  


 


Mono # (Auto)  


 


Eos # (Auto)  


 


Baso # (Auto)  


 


Sodium  


 


Potassium  


 


Chloride  


 


Carbon Dioxide  


 


Anion Gap  


 


BUN  


 


Creatinine  


 


Est GFR ( Amer)  


 


Est GFR (Non-Af Amer)  


 


Random Glucose  


 


Fasting Glucose  94 


 


Calcium  


 


Magnesium  


 


Total Bilirubin  


 


AST  


 


ALT  


 


Alkaline Phosphatase  


 


Total Protein  


 


Albumin  


 


Globulin  


 


Albumin/Globulin Ratio  


 


Triglycerides  87 


 


Cholesterol  174 


 


LDL Cholesterol Direct  124 


 


HDL Cholesterol  32 


 


Free T4   0.83


 


TSH 3rd Generation   2.60


 


Urine Color  


 


Urine Appearance  


 


Urine pH  


 


Ur Specific Gravity  


 


Urine Protein  


 


Urine Glucose (UA)  


 


Urine Ketones  


 


Urine Blood  


 


Urine Nitrate  


 


Urine Bilirubin  


 


Urine Urobilinogen  


 


Ur Leukocyte Esterase  


 


Urine RBC  


 


Urine WBC  


 


Ur Epithelial Cells  


 


Amorphous Sediment  


 


Urine Bacteria  


 


Urine Other  


 


Salicylates  


 


Urine Opiates Screen  


 


Urine Methadone Screen  


 


Acetaminophen  


 


Ur Barbiturates Screen  


 


Ur Phencyclidine Scrn  


 


Ur Amphetamines Screen  


 


U Benzodiazepines Scrn  


 


U Oth Cocaine Metabols  


 


U Cannabinoids Screen  


 


Alcohol, Quantitative  











 





- Smoking Cessation


Smoking Cessation Medication prescribed: No





- Antipsychotic Medications


Pt discharged on 2 or more routine antipsychotic medications: No